# Patient Record
Sex: FEMALE | Race: OTHER | NOT HISPANIC OR LATINO | URBAN - METROPOLITAN AREA
[De-identification: names, ages, dates, MRNs, and addresses within clinical notes are randomized per-mention and may not be internally consistent; named-entity substitution may affect disease eponyms.]

---

## 2020-02-07 ENCOUNTER — OUTPATIENT (OUTPATIENT)
Dept: OUTPATIENT SERVICES | Facility: HOSPITAL | Age: 45
LOS: 1 days | End: 2020-02-07
Payer: COMMERCIAL

## 2020-02-07 DIAGNOSIS — Z01.818 ENCOUNTER FOR OTHER PREPROCEDURAL EXAMINATION: ICD-10-CM

## 2020-02-07 LAB
ALBUMIN SERPL ELPH-MCNC: 4.3 G/DL — SIGNIFICANT CHANGE UP (ref 3.3–5)
ALP SERPL-CCNC: 63 U/L — SIGNIFICANT CHANGE UP (ref 40–120)
ALT FLD-CCNC: 12 U/L — SIGNIFICANT CHANGE UP (ref 10–45)
ANION GAP SERPL CALC-SCNC: 12 MMOL/L — SIGNIFICANT CHANGE UP (ref 5–17)
APTT BLD: 28.3 SEC — SIGNIFICANT CHANGE UP (ref 27.5–36.3)
AST SERPL-CCNC: 13 U/L — SIGNIFICANT CHANGE UP (ref 10–40)
BILIRUB SERPL-MCNC: 0.4 MG/DL — SIGNIFICANT CHANGE UP (ref 0.2–1.2)
BLD GP AB SCN SERPL QL: NEGATIVE — SIGNIFICANT CHANGE UP
BLD GP AB SCN SERPL QL: NEGATIVE — SIGNIFICANT CHANGE UP
BUN SERPL-MCNC: 11 MG/DL — SIGNIFICANT CHANGE UP (ref 7–23)
CALCIUM SERPL-MCNC: 9.5 MG/DL — SIGNIFICANT CHANGE UP (ref 8.4–10.5)
CHLORIDE SERPL-SCNC: 101 MMOL/L — SIGNIFICANT CHANGE UP (ref 96–108)
CO2 SERPL-SCNC: 29 MMOL/L — SIGNIFICANT CHANGE UP (ref 22–31)
CREAT SERPL-MCNC: 1.01 MG/DL — SIGNIFICANT CHANGE UP (ref 0.5–1.3)
GLUCOSE SERPL-MCNC: 85 MG/DL — SIGNIFICANT CHANGE UP (ref 70–99)
HCG SERPL-ACNC: <0 MIU/ML — SIGNIFICANT CHANGE UP
HCT VFR BLD CALC: 45 % — SIGNIFICANT CHANGE UP (ref 34.5–45)
HGB BLD-MCNC: 14.6 G/DL — SIGNIFICANT CHANGE UP (ref 11.5–15.5)
INR BLD: 1.02 — SIGNIFICANT CHANGE UP (ref 0.88–1.16)
MCHC RBC-ENTMCNC: 29.4 PG — SIGNIFICANT CHANGE UP (ref 27–34)
MCHC RBC-ENTMCNC: 32.4 GM/DL — SIGNIFICANT CHANGE UP (ref 32–36)
MCV RBC AUTO: 90.5 FL — SIGNIFICANT CHANGE UP (ref 80–100)
NRBC # BLD: 0 /100 WBCS — SIGNIFICANT CHANGE UP (ref 0–0)
PLATELET # BLD AUTO: 280 K/UL — SIGNIFICANT CHANGE UP (ref 150–400)
POTASSIUM SERPL-MCNC: 4.8 MMOL/L — SIGNIFICANT CHANGE UP (ref 3.5–5.3)
POTASSIUM SERPL-SCNC: 4.8 MMOL/L — SIGNIFICANT CHANGE UP (ref 3.5–5.3)
PROT SERPL-MCNC: 7.1 G/DL — SIGNIFICANT CHANGE UP (ref 6–8.3)
PROTHROM AB SERPL-ACNC: 11.6 SEC — SIGNIFICANT CHANGE UP (ref 10–12.9)
RBC # BLD: 4.97 M/UL — SIGNIFICANT CHANGE UP (ref 3.8–5.2)
RBC # FLD: 13.5 % — SIGNIFICANT CHANGE UP (ref 10.3–14.5)
RH IG SCN BLD-IMP: POSITIVE — SIGNIFICANT CHANGE UP
RH IG SCN BLD-IMP: POSITIVE — SIGNIFICANT CHANGE UP
SODIUM SERPL-SCNC: 142 MMOL/L — SIGNIFICANT CHANGE UP (ref 135–145)
WBC # BLD: 6.7 K/UL — SIGNIFICANT CHANGE UP (ref 3.8–10.5)
WBC # FLD AUTO: 6.7 K/UL — SIGNIFICANT CHANGE UP (ref 3.8–10.5)

## 2020-02-07 PROCEDURE — 86850 RBC ANTIBODY SCREEN: CPT

## 2020-02-07 PROCEDURE — 93010 ELECTROCARDIOGRAM REPORT: CPT

## 2020-02-07 PROCEDURE — 93005 ELECTROCARDIOGRAM TRACING: CPT

## 2020-02-07 PROCEDURE — 85610 PROTHROMBIN TIME: CPT

## 2020-02-07 PROCEDURE — 85027 COMPLETE CBC AUTOMATED: CPT

## 2020-02-07 PROCEDURE — 86901 BLOOD TYPING SEROLOGIC RH(D): CPT

## 2020-02-07 PROCEDURE — 84702 CHORIONIC GONADOTROPIN TEST: CPT

## 2020-02-07 PROCEDURE — 71046 X-RAY EXAM CHEST 2 VIEWS: CPT | Mod: 26

## 2020-02-07 PROCEDURE — 80053 COMPREHEN METABOLIC PANEL: CPT

## 2020-02-07 PROCEDURE — 85730 THROMBOPLASTIN TIME PARTIAL: CPT

## 2020-02-07 PROCEDURE — 71046 X-RAY EXAM CHEST 2 VIEWS: CPT

## 2020-02-11 VITALS
WEIGHT: 157.85 LBS | HEART RATE: 71 BPM | OXYGEN SATURATION: 98 % | RESPIRATION RATE: 16 BRPM | DIASTOLIC BLOOD PRESSURE: 85 MMHG | HEIGHT: 60 IN | SYSTOLIC BLOOD PRESSURE: 123 MMHG | TEMPERATURE: 98 F

## 2020-02-11 NOTE — PRE-OP CHECKLIST - SELECT TESTS ORDERED
Type and Screen/PT/PTT/CMP/CBC/INR/EKG/CXR/HCG PT/PTT/INR/CBC/urine HCG Negative/CMP/EKG/HCG/CXR/Type and Screen

## 2020-02-12 ENCOUNTER — INPATIENT (INPATIENT)
Facility: HOSPITAL | Age: 45
LOS: 1 days | Discharge: HOME | DRG: 743 | End: 2020-02-14
Attending: OBSTETRICS & GYNECOLOGY | Admitting: OBSTETRICS & GYNECOLOGY
Payer: COMMERCIAL

## 2020-02-12 DIAGNOSIS — Z98.51 TUBAL LIGATION STATUS: Chronic | ICD-10-CM

## 2020-02-12 DIAGNOSIS — Z98.890 OTHER SPECIFIED POSTPROCEDURAL STATES: Chronic | ICD-10-CM

## 2020-02-12 LAB
BLD GP AB SCN SERPL QL: NEGATIVE — SIGNIFICANT CHANGE UP
GLUCOSE BLDC GLUCOMTR-MCNC: 90 MG/DL — SIGNIFICANT CHANGE UP (ref 70–99)
HCT VFR BLD CALC: 44.6 % — SIGNIFICANT CHANGE UP (ref 34.5–45)
HGB BLD-MCNC: 14.5 G/DL — SIGNIFICANT CHANGE UP (ref 11.5–15.5)
MCHC RBC-ENTMCNC: 29.8 PG — SIGNIFICANT CHANGE UP (ref 27–34)
MCHC RBC-ENTMCNC: 32.5 GM/DL — SIGNIFICANT CHANGE UP (ref 32–36)
MCV RBC AUTO: 91.6 FL — SIGNIFICANT CHANGE UP (ref 80–100)
NRBC # BLD: 0 /100 WBCS — SIGNIFICANT CHANGE UP (ref 0–0)
PLATELET # BLD AUTO: 232 K/UL — SIGNIFICANT CHANGE UP (ref 150–400)
RBC # BLD: 4.87 M/UL — SIGNIFICANT CHANGE UP (ref 3.8–5.2)
RBC # FLD: 13.1 % — SIGNIFICANT CHANGE UP (ref 10.3–14.5)
RH IG SCN BLD-IMP: POSITIVE — SIGNIFICANT CHANGE UP
WBC # BLD: 15.99 K/UL — HIGH (ref 3.8–10.5)
WBC # FLD AUTO: 15.99 K/UL — HIGH (ref 3.8–10.5)

## 2020-02-12 PROCEDURE — 88304 TISSUE EXAM BY PATHOLOGIST: CPT | Mod: 26

## 2020-02-12 PROCEDURE — 88307 TISSUE EXAM BY PATHOLOGIST: CPT | Mod: 26

## 2020-02-12 PROCEDURE — 88305 TISSUE EXAM BY PATHOLOGIST: CPT | Mod: 26

## 2020-02-12 RX ORDER — ONDANSETRON 8 MG/1
4 TABLET, FILM COATED ORAL EVERY 6 HOURS
Refills: 0 | Status: DISCONTINUED | OUTPATIENT
Start: 2020-02-12 | End: 2020-02-14

## 2020-02-12 RX ORDER — GABAPENTIN 400 MG/1
0 CAPSULE ORAL
Qty: 0 | Refills: 0 | DISCHARGE

## 2020-02-12 RX ORDER — ALBUTEROL 90 UG/1
0 AEROSOL, METERED ORAL
Qty: 0 | Refills: 0 | DISCHARGE

## 2020-02-12 RX ORDER — ACETAMINOPHEN 500 MG
1000 TABLET ORAL EVERY 8 HOURS
Refills: 0 | Status: DISCONTINUED | OUTPATIENT
Start: 2020-02-12 | End: 2020-02-14

## 2020-02-12 RX ORDER — OXYCODONE HYDROCHLORIDE 5 MG/1
10 TABLET ORAL EVERY 6 HOURS
Refills: 0 | Status: DISCONTINUED | OUTPATIENT
Start: 2020-02-12 | End: 2020-02-14

## 2020-02-12 RX ORDER — METOCLOPRAMIDE HCL 10 MG
10 TABLET ORAL EVERY 6 HOURS
Refills: 0 | Status: DISCONTINUED | OUTPATIENT
Start: 2020-02-12 | End: 2020-02-14

## 2020-02-12 RX ORDER — SENNA PLUS 8.6 MG/1
2 TABLET ORAL AT BEDTIME
Refills: 0 | Status: DISCONTINUED | OUTPATIENT
Start: 2020-02-12 | End: 2020-02-14

## 2020-02-12 RX ORDER — HYDROMORPHONE HYDROCHLORIDE 2 MG/ML
0.5 INJECTION INTRAMUSCULAR; INTRAVENOUS; SUBCUTANEOUS
Refills: 0 | Status: DISCONTINUED | OUTPATIENT
Start: 2020-02-12 | End: 2020-02-12

## 2020-02-12 RX ORDER — IBUPROFEN 200 MG
0 TABLET ORAL
Qty: 0 | Refills: 0 | DISCHARGE

## 2020-02-12 RX ORDER — ENOXAPARIN SODIUM 100 MG/ML
40 INJECTION SUBCUTANEOUS EVERY 24 HOURS
Refills: 0 | Status: DISCONTINUED | OUTPATIENT
Start: 2020-02-13 | End: 2020-02-14

## 2020-02-12 RX ORDER — POLYETHYLENE GLYCOL 3350 17 G/17G
17 POWDER, FOR SOLUTION ORAL DAILY
Refills: 0 | Status: DISCONTINUED | OUTPATIENT
Start: 2020-02-12 | End: 2020-02-14

## 2020-02-12 RX ORDER — GABAPENTIN 400 MG/1
300 CAPSULE ORAL AT BEDTIME
Refills: 0 | Status: DISCONTINUED | OUTPATIENT
Start: 2020-02-12 | End: 2020-02-14

## 2020-02-12 RX ORDER — ALBUTEROL 90 UG/1
2 AEROSOL, METERED ORAL EVERY 6 HOURS
Refills: 0 | Status: DISCONTINUED | OUTPATIENT
Start: 2020-02-12 | End: 2020-02-14

## 2020-02-12 RX ORDER — OXYCODONE HYDROCHLORIDE 5 MG/1
5 TABLET ORAL EVERY 4 HOURS
Refills: 0 | Status: DISCONTINUED | OUTPATIENT
Start: 2020-02-12 | End: 2020-02-14

## 2020-02-12 RX ORDER — SODIUM CHLORIDE 9 MG/ML
1000 INJECTION, SOLUTION INTRAVENOUS
Refills: 0 | Status: DISCONTINUED | OUTPATIENT
Start: 2020-02-12 | End: 2020-02-14

## 2020-02-12 RX ORDER — IBUPROFEN 200 MG
600 TABLET ORAL EVERY 6 HOURS
Refills: 0 | Status: DISCONTINUED | OUTPATIENT
Start: 2020-02-12 | End: 2020-02-14

## 2020-02-12 RX ADMIN — HYDROMORPHONE HYDROCHLORIDE 0.5 MILLIGRAM(S): 2 INJECTION INTRAMUSCULAR; INTRAVENOUS; SUBCUTANEOUS at 14:36

## 2020-02-12 RX ADMIN — Medication 600 MILLIGRAM(S): at 23:40

## 2020-02-12 RX ADMIN — SENNA PLUS 2 TABLET(S): 8.6 TABLET ORAL at 21:35

## 2020-02-12 RX ADMIN — Medication 600 MILLIGRAM(S): at 19:44

## 2020-02-12 RX ADMIN — GABAPENTIN 300 MILLIGRAM(S): 400 CAPSULE ORAL at 21:35

## 2020-02-12 RX ADMIN — Medication 1000 MILLIGRAM(S): at 22:35

## 2020-02-12 RX ADMIN — HYDROMORPHONE HYDROCHLORIDE 0.5 MILLIGRAM(S): 2 INJECTION INTRAMUSCULAR; INTRAVENOUS; SUBCUTANEOUS at 14:52

## 2020-02-12 RX ADMIN — Medication 1000 MILLIGRAM(S): at 21:35

## 2020-02-12 RX ADMIN — HYDROMORPHONE HYDROCHLORIDE 0.5 MILLIGRAM(S): 2 INJECTION INTRAMUSCULAR; INTRAVENOUS; SUBCUTANEOUS at 15:08

## 2020-02-12 RX ADMIN — HYDROMORPHONE HYDROCHLORIDE 0.5 MILLIGRAM(S): 2 INJECTION INTRAMUSCULAR; INTRAVENOUS; SUBCUTANEOUS at 20:10

## 2020-02-12 RX ADMIN — Medication 600 MILLIGRAM(S): at 17:52

## 2020-02-12 RX ADMIN — SODIUM CHLORIDE 110 MILLILITER(S): 9 INJECTION, SOLUTION INTRAVENOUS at 14:43

## 2020-02-12 RX ADMIN — HYDROMORPHONE HYDROCHLORIDE 0.5 MILLIGRAM(S): 2 INJECTION INTRAMUSCULAR; INTRAVENOUS; SUBCUTANEOUS at 14:54

## 2020-02-12 RX ADMIN — HYDROMORPHONE HYDROCHLORIDE 0.5 MILLIGRAM(S): 2 INJECTION INTRAMUSCULAR; INTRAVENOUS; SUBCUTANEOUS at 19:52

## 2020-02-12 NOTE — PROGRESS NOTE ADULT - SUBJECTIVE AND OBJECTIVE BOX
Patient evaluated at bedside. She has no complaints except for some mild pain. She reports pain is well controlled with medications, just got ibuprofen. She denies HA, dizziness, SOB, CP, palpitations, n/v. Tolerating some clears.     T(C): 37.5 (02-12-20 @ 16:48), Max: 37.5 (02-12-20 @ 16:48)  HR: 98 (02-12-20 @ 16:48) (80 - 98)  BP: 124/80 (02-12-20 @ 16:48) (124/64 - 145/102)  RR: 17 (02-12-20 @ 16:48) (16 - 18)  SpO2: 96% (02-12-20 @ 16:48) (96% - 100%)    GA:  Resp: normal respiratory effort  Abd: no BS, soft, NT/ND, no rebound or guarding, abdominal incision covered with pressure bandage  Extrem: SCDs in place, no LE edema       02-12 @ 07:01  -  02-12 @ 18:05  --------------------------------------------------------  IN: 220 mL / OUT: 195 mL / NET: 25 mL

## 2020-02-12 NOTE — BRIEF OPERATIVE NOTE - OPERATION/FINDINGS
16cm enlarged globular uterus; small left hemorrhagic ovarian cyst; normal appearing ovaries aside from left cyst; normal fallopian tube remnants distally s/p previous tubal ligation; small left inguinal hernia noted - rectus fascia primarily repaired easily

## 2020-02-12 NOTE — BRIEF OPERATIVE NOTE - NSICDXBRIEFPROCEDURE_GEN_ALL_CORE_FT
PROCEDURES:  Supracervical abdominal hysterectomy with salpingectomy 12-Feb-2020 13:40:49  Kendall Escalante

## 2020-02-12 NOTE — H&P ADULT - HISTORY OF PRESENT ILLNESS
44yo  presenting for scheduled supracervical hysterectomy and bilateral salpingectomy. Patient has a long standing history of heavy menstrual bleeding with periods as well as intermenstrual bleeding. She denies ever needing a blood transfusion. She has previously tried lysteda but did not help bleeding. She also reports severe pain with periods as well and in between periods. She suffers from fibromyalgia as well. She desires definitive surgical management of pelvic pain and bleeding.     OBHx:    x2  VTOP x2    GYNHx: recent normal pap smear and endometrial biopsy  PMH: seasonal asthma - last used albuterol in 2020; fibromyalgia on gabapentin  PSH: hx tubal ligation (pt reports one incision in belly button, unsure if laparoscopic); hysteroscopic polypectomy/D&C  for AUB  Meds: Gabapentin 300mg qhs  All: dairy (rash)

## 2020-02-12 NOTE — H&P ADULT - ASSESSMENT
44yo  presenting for scheduled GREMAN, BS for AUB and pelvic pain  -NPO  -IVF  -cross for 2u PRBC  -plan for regional admission postop

## 2020-02-12 NOTE — PROGRESS NOTE ADULT - ASSESSMENT
45y female POD#0  s/p open GERMAN, BS, uncomplicated     1. Neuro: oral pain medications  2. CV: VS per routine, CBC at 1800, AM CBC ordered   3. Pulm: encourage IS and ambulation  4. GI: clear diet  5. : robison, will get urine cx in the morning from robison, remove robison in AM   6. DVT ppx: encourage ambulation, SCDs   Dispo: pending clinical improvement

## 2020-02-13 LAB
ANION GAP SERPL CALC-SCNC: 13 MMOL/L — SIGNIFICANT CHANGE UP (ref 5–17)
BASOPHILS # BLD AUTO: 0.02 K/UL — SIGNIFICANT CHANGE UP (ref 0–0.2)
BASOPHILS NFR BLD AUTO: 0.1 % — SIGNIFICANT CHANGE UP (ref 0–2)
BUN SERPL-MCNC: 8 MG/DL — SIGNIFICANT CHANGE UP (ref 7–23)
CALCIUM SERPL-MCNC: 8.9 MG/DL — SIGNIFICANT CHANGE UP (ref 8.4–10.5)
CHLORIDE SERPL-SCNC: 104 MMOL/L — SIGNIFICANT CHANGE UP (ref 96–108)
CO2 SERPL-SCNC: 21 MMOL/L — LOW (ref 22–31)
CREAT SERPL-MCNC: 0.88 MG/DL — SIGNIFICANT CHANGE UP (ref 0.5–1.3)
EOSINOPHIL # BLD AUTO: 0 K/UL — SIGNIFICANT CHANGE UP (ref 0–0.5)
EOSINOPHIL NFR BLD AUTO: 0 % — SIGNIFICANT CHANGE UP (ref 0–6)
GLUCOSE SERPL-MCNC: 119 MG/DL — HIGH (ref 70–99)
HCT VFR BLD CALC: 41.6 % — SIGNIFICANT CHANGE UP (ref 34.5–45)
HGB BLD-MCNC: 13.2 G/DL — SIGNIFICANT CHANGE UP (ref 11.5–15.5)
IMM GRANULOCYTES NFR BLD AUTO: 0.5 % — SIGNIFICANT CHANGE UP (ref 0–1.5)
LYMPHOCYTES # BLD AUTO: 1.16 K/UL — SIGNIFICANT CHANGE UP (ref 1–3.3)
LYMPHOCYTES # BLD AUTO: 7.8 % — LOW (ref 13–44)
MCHC RBC-ENTMCNC: 29.3 PG — SIGNIFICANT CHANGE UP (ref 27–34)
MCHC RBC-ENTMCNC: 31.7 GM/DL — LOW (ref 32–36)
MCV RBC AUTO: 92.2 FL — SIGNIFICANT CHANGE UP (ref 80–100)
MONOCYTES # BLD AUTO: 0.88 K/UL — SIGNIFICANT CHANGE UP (ref 0–0.9)
MONOCYTES NFR BLD AUTO: 5.9 % — SIGNIFICANT CHANGE UP (ref 2–14)
NEUTROPHILS # BLD AUTO: 12.66 K/UL — HIGH (ref 1.8–7.4)
NEUTROPHILS NFR BLD AUTO: 85.7 % — HIGH (ref 43–77)
NRBC # BLD: 0 /100 WBCS — SIGNIFICANT CHANGE UP (ref 0–0)
PLATELET # BLD AUTO: 228 K/UL — SIGNIFICANT CHANGE UP (ref 150–400)
POTASSIUM SERPL-MCNC: 4.3 MMOL/L — SIGNIFICANT CHANGE UP (ref 3.5–5.3)
POTASSIUM SERPL-SCNC: 4.3 MMOL/L — SIGNIFICANT CHANGE UP (ref 3.5–5.3)
RBC # BLD: 4.51 M/UL — SIGNIFICANT CHANGE UP (ref 3.8–5.2)
RBC # FLD: 13.2 % — SIGNIFICANT CHANGE UP (ref 10.3–14.5)
SODIUM SERPL-SCNC: 138 MMOL/L — SIGNIFICANT CHANGE UP (ref 135–145)
WBC # BLD: 14.79 K/UL — HIGH (ref 3.8–10.5)
WBC # FLD AUTO: 14.79 K/UL — HIGH (ref 3.8–10.5)

## 2020-02-13 RX ADMIN — Medication 600 MILLIGRAM(S): at 11:00

## 2020-02-13 RX ADMIN — Medication 1000 MILLIGRAM(S): at 22:10

## 2020-02-13 RX ADMIN — Medication 1000 MILLIGRAM(S): at 06:11

## 2020-02-13 RX ADMIN — Medication 600 MILLIGRAM(S): at 07:32

## 2020-02-13 RX ADMIN — Medication 1000 MILLIGRAM(S): at 13:01

## 2020-02-13 RX ADMIN — Medication 600 MILLIGRAM(S): at 06:32

## 2020-02-13 RX ADMIN — Medication 600 MILLIGRAM(S): at 18:20

## 2020-02-13 RX ADMIN — POLYETHYLENE GLYCOL 3350 17 GRAM(S): 17 POWDER, FOR SOLUTION ORAL at 11:00

## 2020-02-13 RX ADMIN — Medication 600 MILLIGRAM(S): at 17:31

## 2020-02-13 RX ADMIN — Medication 600 MILLIGRAM(S): at 12:00

## 2020-02-13 RX ADMIN — Medication 1000 MILLIGRAM(S): at 05:11

## 2020-02-13 RX ADMIN — Medication 1000 MILLIGRAM(S): at 23:10

## 2020-02-13 RX ADMIN — Medication 1000 MILLIGRAM(S): at 14:01

## 2020-02-13 RX ADMIN — GABAPENTIN 300 MILLIGRAM(S): 400 CAPSULE ORAL at 22:10

## 2020-02-13 RX ADMIN — Medication 600 MILLIGRAM(S): at 23:43

## 2020-02-13 RX ADMIN — ENOXAPARIN SODIUM 40 MILLIGRAM(S): 100 INJECTION SUBCUTANEOUS at 05:11

## 2020-02-13 RX ADMIN — Medication 600 MILLIGRAM(S): at 00:40

## 2020-02-13 NOTE — PROGRESS NOTE ADULT - SUBJECTIVE AND OBJECTIVE BOX
Pt seen and examined at bedside. Pt states mild abdominal pain controlled with pain medication. Pt is ambulating, tolerating regular diet, passing flatus, and voiding without issues.   Pt denies fever, chills, chest pain, SOB, nausea, vomiting, lightheadedness, or dizziness.      T(F): 98.2 (02-13-20 @ 09:16), Max: 99.5 (02-12-20 @ 16:48)  HR: 72 (02-13-20 @ 09:16) (67 - 98)  BP: 119/82 (02-13-20 @ 09:16) (116/75 - 145/102)  RR: 18 (02-13-20 @ 09:16) (16 - 18)  SpO2: 96% (02-13-20 @ 09:16) (96% - 100%)  Wt(kg): --  I&O's Summary    12 Feb 2020 07:01  -  13 Feb 2020 07:00  --------------------------------------------------------  IN: 1430 mL / OUT: 2895 mL / NET: -1465 mL    13 Feb 2020 07:01  -  13 Feb 2020 12:43  --------------------------------------------------------  IN: 0 mL / OUT: 1000 mL / NET: -1000 mL    MEDICATIONS  (STANDING):  acetaminophen   Tablet .. 1000 milliGRAM(s) Oral every 8 hours  enoxaparin Injectable 40 milliGRAM(s) SubCutaneous every 24 hours  gabapentin 300 milliGRAM(s) Oral at bedtime  ibuprofen  Tablet. 600 milliGRAM(s) Oral every 6 hours  lactated ringers. 1000 milliLiter(s) (110 mL/Hr) IV Continuous <Continuous>  polyethylene glycol 3350 17 Gram(s) Oral daily  senna 2 Tablet(s) Oral at bedtime    MEDICATIONS  (PRN):  ALBUTerol    90 MICROgram(s) HFA Inhaler 2 Puff(s) Inhalation every 6 hours PRN Shortness of Breath and/or Wheezing  metoclopramide Injectable 10 milliGRAM(s) IV Push every 6 hours PRN Nausea and/or Vomiting  ondansetron Injectable 4 milliGRAM(s) IV Push every 6 hours PRN Nausea and/or Vomiting 1st line  oxyCODONE    IR 5 milliGRAM(s) Oral every 4 hours PRN Moderate Pain (4 - 6)  oxyCODONE    IR 10 milliGRAM(s) Oral every 6 hours PRN Severe Pain (7 - 10)    Physical Exam:  Constitutional: NAD  Pulmonary: clear to auscultation bilaterally   Cardiovascular: Regular rate and rhythm   Abdomen: incision site clean, dry, intact. Soft, mildly tender, nondistended, no guarding, no rebound, +bowel sounds  Extremities: no lower extremity edema or calve tenderness. SCDs in place     LABS:                        13.2   14.79 )-----------( 228      ( 13 Feb 2020 07:05 )             41.6     02-13    138  |  104  |  8   ----------------------------<  119<H>  4.3   |  21<L>  |  0.88    Ca    8.9      13 Feb 2020 07:05            RADIOLOGY & ADDITIONAL TESTS:

## 2020-02-13 NOTE — PROGRESS NOTE ADULT - SUBJECTIVE AND OBJECTIVE BOX
Attending Note  Patient is comfortable, no complaints  VS-= stable afebrile  Abd- soft, +BS steri in place incision intact  Pelvic- scant brownish discharge  EXT- mica's neg  cbc                      13.2   14.79 )-----------( 228      ( 13 Feb 2020 07:05 )             41.6   urine c&s no growth  IMP: POst-op day stable  Plan; regular diet          ambulate          anticipate discharge in am

## 2020-02-13 NOTE — PROGRESS NOTE ADULT - ASSESSMENT
45y F POD1 s/p supracervical hysterectomy, bilateral salpingectomy. Pt is hemodynamically stable.                                    1. Neuro/Pain:  Tylenol, Motrin, Oxycodone PRN  2  CV:   VS per routine  3. Pulm: Encourage ISS at least 10 times per hour while awake   4. GI: Regular diet, abdominal binder   5. :  Voiding. f/u urine culture  6. Heme: Stable   7. DVT ppx: SCDs, lovenox  8. Activity-ambulate as tolerated

## 2020-02-13 NOTE — PROGRESS NOTE ADULT - SUBJECTIVE AND OBJECTIVE BOX
GYN Progress Note    Patient seen at bedside.  Pain controlled on current regimen.   Tolerating clear liquids. Passing flatus  has not been OOB  [place] in place    Denies CP, palpitations, SOB, fever, chills, nausea, vomiting.    Vital Signs Last 24 Hrs  T(C): 36.8 (13 Feb 2020 09:16), Max: 37.5 (12 Feb 2020 16:48)  T(F): 98.2 (13 Feb 2020 09:16), Max: 99.5 (12 Feb 2020 16:48)  HR: 72 (13 Feb 2020 09:16) (67 - 98)  BP: 119/82 (13 Feb 2020 09:16) (116/75 - 145/102)  BP(mean): 101 (12 Feb 2020 15:36) (95 - 104)  RR: 18 (13 Feb 2020 09:16) (16 - 18)  SpO2: 96% (13 Feb 2020 09:16) (96% - 100%)    Physical Exam:  Gen: No Acute Distress  Pulm: Normal work of breathing  GI: soft, appropriately tender, nondistended, +BS, no rebound, no guarding.  Incision C/D/I  Ext: SCDs in place, [facility]    I&O's Summary    12 Feb 2020 07:01  -  13 Feb 2020 07:00  --------------------------------------------------------  IN: 1430 mL / OUT: 2895 mL / NET: -1465 mL    13 Feb 2020 07:01  -  13 Feb 2020 09:26  --------------------------------------------------------  IN: 0 mL / OUT: 250 mL / NET: -250 mL      MEDICATIONS  (STANDING):  acetaminophen   Tablet .. 1000 milliGRAM(s) Oral every 8 hours  enoxaparin Injectable 40 milliGRAM(s) SubCutaneous every 24 hours  gabapentin 300 milliGRAM(s) Oral at bedtime  ibuprofen  Tablet. 600 milliGRAM(s) Oral every 6 hours  lactated ringers. 1000 milliLiter(s) (110 mL/Hr) IV Continuous <Continuous>  polyethylene glycol 3350 17 Gram(s) Oral daily  senna 2 Tablet(s) Oral at bedtime    MEDICATIONS  (PRN):  ALBUTerol    90 MICROgram(s) HFA Inhaler 2 Puff(s) Inhalation every 6 hours PRN Shortness of Breath and/or Wheezing  metoclopramide Injectable 10 milliGRAM(s) IV Push every 6 hours PRN Nausea and/or Vomiting  ondansetron Injectable 4 milliGRAM(s) IV Push every 6 hours PRN Nausea and/or Vomiting 1st line  oxyCODONE    IR 5 milliGRAM(s) Oral every 4 hours PRN Moderate Pain (4 - 6)  oxyCODONE    IR 10 milliGRAM(s) Oral every 6 hours PRN Severe Pain (7 - 10)      LABS:                        13.2   14.79 )-----------( 228      ( 13 Feb 2020 07:05 )             41.6     02-13    138  |  104  |  8   ----------------------------<  119<H>  4.3   |  21<L>  |  0.88    Ca    8.9      13 Feb 2020 07:05

## 2020-02-14 VITALS
TEMPERATURE: 99 F | RESPIRATION RATE: 17 BRPM | DIASTOLIC BLOOD PRESSURE: 68 MMHG | HEART RATE: 78 BPM | OXYGEN SATURATION: 97 % | SYSTOLIC BLOOD PRESSURE: 110 MMHG

## 2020-02-14 LAB
CULTURE RESULTS: NO GROWTH — SIGNIFICANT CHANGE UP
SPECIMEN SOURCE: SIGNIFICANT CHANGE UP

## 2020-02-14 RX ORDER — ACETAMINOPHEN 500 MG
2 TABLET ORAL
Qty: 0 | Refills: 0 | DISCHARGE
Start: 2020-02-14

## 2020-02-14 RX ADMIN — Medication 1000 MILLIGRAM(S): at 07:10

## 2020-02-14 RX ADMIN — Medication 600 MILLIGRAM(S): at 06:13

## 2020-02-14 RX ADMIN — Medication 1000 MILLIGRAM(S): at 06:13

## 2020-02-14 RX ADMIN — Medication 600 MILLIGRAM(S): at 00:40

## 2020-02-14 RX ADMIN — ENOXAPARIN SODIUM 40 MILLIGRAM(S): 100 INJECTION SUBCUTANEOUS at 06:13

## 2020-02-14 RX ADMIN — Medication 600 MILLIGRAM(S): at 07:10

## 2020-02-14 NOTE — DISCHARGE NOTE PROVIDER - NSDCCPGOAL_GEN_ALL_CORE_FT
Please follow up with Dr. Bennett in 1-2 weeks. Call your doctor if you have severe, worsening pain, heavy vaginal bleeding, or fevers greater than 100.4 F. It was a pleasure taking care of you.

## 2020-02-14 NOTE — DISCHARGE NOTE PROVIDER - NSDCMRMEDTOKEN_GEN_ALL_CORE_FT
acetaminophen 500 mg oral tablet: 2 tab(s) orally every 8 hours  albuterol 90 mcg/inh inhalation aerosol with adapter:   gabapentin 300 mg oral capsule: orally once a day (at bedtime)  ibuprofen 600 mg oral tablet:

## 2020-02-14 NOTE — DISCHARGE NOTE NURSING/CASE MANAGEMENT/SOCIAL WORK - PATIENT PORTAL LINK FT
You can access the FollowMyHealth Patient Portal offered by [facility] by registering at the following website: [email or web address]. By joining [employer] [employer]’s FollowMyHealth portal, you will also be able to view your health information using other applications (apps) compatible with our system.

## 2020-02-14 NOTE — PROGRESS NOTE ADULT - REASON FOR ADMISSION
scheduled GERMAN, BS

## 2020-02-14 NOTE — PROGRESS NOTE ADULT - SUBJECTIVE AND OBJECTIVE BOX
GYN Progress Note    Patient seen at bedside.  Pain controlled on current regimen.   Tolerating regular diet. Passing flatus.   OOB  Voiding  Wants to go home today.    Denies CP, palpitations, SOB, fever, chills, nausea, vomiting.    Vital Signs Last 24 Hrs  T(C): 36.9 (14 Feb 2020 05:40), Max: 37 (14 Feb 2020 01:36)  T(F): 98.5 (14 Feb 2020 05:40), Max: 98.6 (14 Feb 2020 01:36)  HR: 68 (14 Feb 2020 05:40) (68 - 92)  BP: 113/75 (14 Feb 2020 05:40) (113/75 - 135/95)  BP(mean): --  RR: 16 (14 Feb 2020 05:40) (16 - 18)  SpO2: 95% (14 Feb 2020 05:40) (95% - 99%)    Physical Exam:  Gen: No Acute Distress  Pulm: Normal work of breathing  GI: soft, appropriately tender, nondistended, +BS, no rebound, no guarding.  Incision C/D/I  Ext: SCDs in place, wnl    I&O's Summary    12 Feb 2020 07:01  -  13 Feb 2020 07:00  --------------------------------------------------------  IN: 1430 mL / OUT: 2895 mL / NET: -1465 mL    13 Feb 2020 07:01  -  14 Feb 2020 06:47  --------------------------------------------------------  IN: 0 mL / OUT: 4150 mL / NET: -4150 mL      MEDICATIONS  (STANDING):  acetaminophen   Tablet .. 1000 milliGRAM(s) Oral every 8 hours  enoxaparin Injectable 40 milliGRAM(s) SubCutaneous every 24 hours  gabapentin 300 milliGRAM(s) Oral at bedtime  ibuprofen  Tablet. 600 milliGRAM(s) Oral every 6 hours  lactated ringers. 1000 milliLiter(s) (110 mL/Hr) IV Continuous <Continuous>  polyethylene glycol 3350 17 Gram(s) Oral daily  senna 2 Tablet(s) Oral at bedtime    MEDICATIONS  (PRN):  ALBUTerol    90 MICROgram(s) HFA Inhaler 2 Puff(s) Inhalation every 6 hours PRN Shortness of Breath and/or Wheezing  metoclopramide Injectable 10 milliGRAM(s) IV Push every 6 hours PRN Nausea and/or Vomiting  ondansetron Injectable 4 milliGRAM(s) IV Push every 6 hours PRN Nausea and/or Vomiting 1st line  oxyCODONE    IR 5 milliGRAM(s) Oral every 4 hours PRN Moderate Pain (4 - 6)  oxyCODONE    IR 10 milliGRAM(s) Oral every 6 hours PRN Severe Pain (7 - 10)      LABS:                        13.2   14.79 )-----------( 228      ( 13 Feb 2020 07:05 )             41.6     02-13    138  |  104  |  8   ----------------------------<  119<H>  4.3   |  21<L>  |  0.88    Ca    8.9      13 Feb 2020 07:05

## 2020-02-14 NOTE — PROGRESS NOTE ADULT - ASSESSMENT
45y Female POD#2 s/p GERMAN, BS                                   1. Neuro/Pain:  tylenol, motrin, oxycodone PRN  2  CV:   VS per routine  3. Pulm: Encourage ISS  4. GI: regular diet  5. : voiding; urine culture NGTD  6. Heme: stable  7. ID: --  8. DVT ppx: SCDs, lovenox  9. Dispo: d/c likely today

## 2020-02-14 NOTE — DISCHARGE NOTE PROVIDER - HOSPITAL COURSE
Admitted s/p GERMAN, BS for 16w globular uterus and hx of pelvic pain and heavy menstrual bleeding. Uncomplicated postoperative course passing all milestones. Cleared for discharge.

## 2020-02-14 NOTE — DISCHARGE NOTE PROVIDER - CARE PROVIDER_API CALL
Job Bennett)  Obstetrics and Gynecology  [address], [facility], [place] [address]  Phone: [phone]  Fax: [phone]  Follow Up Time:

## 2020-02-19 PROCEDURE — 86901 BLOOD TYPING SEROLOGIC RH(D): CPT

## 2020-02-19 PROCEDURE — 85025 COMPLETE CBC W/AUTO DIFF WBC: CPT

## 2020-02-19 PROCEDURE — 88304 TISSUE EXAM BY PATHOLOGIST: CPT

## 2020-02-19 PROCEDURE — 80048 BASIC METABOLIC PNL TOTAL CA: CPT

## 2020-02-19 PROCEDURE — 87086 URINE CULTURE/COLONY COUNT: CPT

## 2020-02-19 PROCEDURE — 86850 RBC ANTIBODY SCREEN: CPT

## 2020-02-19 PROCEDURE — 82962 GLUCOSE BLOOD TEST: CPT

## 2020-02-19 PROCEDURE — 85027 COMPLETE CBC AUTOMATED: CPT

## 2020-02-19 PROCEDURE — 88307 TISSUE EXAM BY PATHOLOGIST: CPT

## 2020-02-19 PROCEDURE — 36415 COLL VENOUS BLD VENIPUNCTURE: CPT

## 2020-02-19 PROCEDURE — 88305 TISSUE EXAM BY PATHOLOGIST: CPT

## 2020-02-20 LAB — SURGICAL PATHOLOGY STUDY: SIGNIFICANT CHANGE UP

## 2020-02-24 DIAGNOSIS — N80.0 ENDOMETRIOSIS OF UTERUS: ICD-10-CM

## 2020-02-24 DIAGNOSIS — N93.9 ABNORMAL UTERINE AND VAGINAL BLEEDING, UNSPECIFIED: ICD-10-CM

## 2020-02-24 DIAGNOSIS — D25.9 LEIOMYOMA OF UTERUS, UNSPECIFIED: ICD-10-CM

## 2020-02-24 DIAGNOSIS — N83.202 UNSPECIFIED OVARIAN CYST, LEFT SIDE: ICD-10-CM

## 2023-12-18 NOTE — PROGRESS NOTE ADULT - SUBJECTIVE AND OBJECTIVE BOX
Clinic Administered Medication Documentation      Depo Provera Documentation    Depo-Provera Standing Order inclusion/exclusion criteria reviewed.     Is this the initial or subsequent dose of Depo Provera? Subsequent dose - patient is within the acceptable window of time (11-15 weeks) for subsequent injection. Pregnancy test not indicated.    Patient meets: inclusion criteria     Is there an active order (written within the past 365 days, with administrations remaining, not ) in the chart? Yes.     Prior to injection, verified patient identity using patient's name and date of birth. Medication was administered. Please see MAR and medication order for additional information.     Vial/Syringe: Single dose vial. Was entire vial of medication used? Yes    Patient instructed to report any adverse reaction to staff immediately.  NEXT INJECTION DUE: 3/4/24 - 24    Verified that the patient has refills remaining in their prescription.     Attending Note- discharge  Doing well, +BM, tolerating regular diet  VS- afebrile  Abd- soft, +BS incision with steri intact  no abnormal bleeding  IMP: post-op day 2 stable  Plan; D/c home          f/u 1 wk for wound check         Motrin prn for pain